# Patient Record
Sex: MALE | Race: WHITE | Employment: OTHER | URBAN - METROPOLITAN AREA
[De-identification: names, ages, dates, MRNs, and addresses within clinical notes are randomized per-mention and may not be internally consistent; named-entity substitution may affect disease eponyms.]

---

## 2017-03-05 ENCOUNTER — ED HISTORICAL/CONVERTED ENCOUNTER (OUTPATIENT)
Dept: OTHER | Age: 72
End: 2017-03-05

## 2020-12-26 ENCOUNTER — APPOINTMENT (OUTPATIENT)
Dept: GENERAL RADIOLOGY | Age: 75
End: 2020-12-26
Attending: EMERGENCY MEDICINE
Payer: MEDICARE

## 2020-12-26 ENCOUNTER — HOSPITAL ENCOUNTER (EMERGENCY)
Age: 75
Discharge: HOME | End: 2020-12-26
Attending: EMERGENCY MEDICINE
Payer: MEDICARE

## 2020-12-26 ENCOUNTER — APPOINTMENT (OUTPATIENT)
Dept: CT IMAGING | Age: 75
End: 2020-12-26
Attending: EMERGENCY MEDICINE
Payer: MEDICARE

## 2020-12-26 VITALS
TEMPERATURE: 97.7 F | RESPIRATION RATE: 17 BRPM | OXYGEN SATURATION: 98 % | HEART RATE: 81 BPM | BODY MASS INDEX: 23.7 KG/M2 | DIASTOLIC BLOOD PRESSURE: 93 MMHG | WEIGHT: 175 LBS | HEIGHT: 72 IN | SYSTOLIC BLOOD PRESSURE: 146 MMHG

## 2020-12-26 DIAGNOSIS — E86.0 DEHYDRATION: ICD-10-CM

## 2020-12-26 DIAGNOSIS — R42 DIZZINESS: Primary | ICD-10-CM

## 2020-12-26 LAB
ALBUMIN SERPL-MCNC: 3.3 G/DL (ref 3.5–5)
ALBUMIN/GLOB SERPL: 0.9 {RATIO} (ref 1.1–2.2)
ALP SERPL-CCNC: 90 U/L (ref 45–117)
ALT SERPL-CCNC: 42 U/L (ref 12–78)
ANION GAP SERPL CALC-SCNC: 9 MMOL/L (ref 5–15)
AST SERPL W P-5'-P-CCNC: 44 U/L (ref 15–37)
BASOPHILS # BLD: 0 K/UL (ref 0–0.2)
BASOPHILS NFR BLD: 1 % (ref 0–2.5)
BILIRUB SERPL-MCNC: 1.4 MG/DL (ref 0.2–1)
BUN SERPL-MCNC: 15 MG/DL (ref 6–20)
BUN/CREAT SERPL: 16 (ref 12–20)
CA-I BLD-MCNC: 8.4 MG/DL (ref 8.5–10.1)
CHLORIDE SERPL-SCNC: 95 MMOL/L (ref 97–108)
CO2 SERPL-SCNC: 30 MMOL/L (ref 21–32)
CREAT SERPL-MCNC: 0.94 MG/DL (ref 0.7–1.3)
EOSINOPHIL # BLD: 0 K/UL (ref 0–0.7)
EOSINOPHIL NFR BLD: 0 % (ref 0.9–2.9)
ERYTHROCYTE [DISTWIDTH] IN BLOOD BY AUTOMATED COUNT: 13.8 % (ref 11.5–14.5)
GLOBULIN SER CALC-MCNC: 3.8 G/DL (ref 2–4)
GLUCOSE SERPL-MCNC: 95 MG/DL (ref 65–100)
HCT VFR BLD AUTO: 46.9 % (ref 41–53)
HGB BLD-MCNC: 15.9 G/DL (ref 13.5–17.5)
LYMPHOCYTES # BLD: 1 K/UL (ref 1–4.8)
LYMPHOCYTES NFR BLD: 11 % (ref 20.5–51.1)
MCH RBC QN AUTO: 31.7 PG (ref 31–34)
MCHC RBC AUTO-ENTMCNC: 33.8 G/DL (ref 31–36)
MCV RBC AUTO: 93.9 FL (ref 80–100)
MONOCYTES # BLD: 0.9 K/UL (ref 0.2–2.4)
MONOCYTES NFR BLD: 11 % (ref 1.7–9.3)
NEUTS SEG # BLD: 6.5 K/UL (ref 1.8–7.7)
NEUTS SEG NFR BLD: 77 % (ref 42–75)
NRBC # BLD: 0 K/UL
NRBC BLD-RTO: 0 PER 100 WBC
PLATELET # BLD AUTO: 253 K/UL
PMV BLD AUTO: 7.4 FL (ref 6.5–11.5)
POTASSIUM SERPL-SCNC: 3.5 MMOL/L (ref 3.5–5.1)
PROT SERPL-MCNC: 7.1 G/DL (ref 6.4–8.2)
RBC # BLD AUTO: 5 M/UL (ref 4.5–5.9)
SODIUM SERPL-SCNC: 134 MMOL/L (ref 136–145)
TROPONIN I SERPL-MCNC: <0.05 NG/ML
WBC # BLD AUTO: 8.4 K/UL (ref 4.4–11.3)

## 2020-12-26 PROCEDURE — 71045 X-RAY EXAM CHEST 1 VIEW: CPT

## 2020-12-26 PROCEDURE — 99283 EMERGENCY DEPT VISIT LOW MDM: CPT

## 2020-12-26 PROCEDURE — 70450 CT HEAD/BRAIN W/O DYE: CPT

## 2020-12-26 PROCEDURE — 80053 COMPREHEN METABOLIC PANEL: CPT

## 2020-12-26 PROCEDURE — 74011250636 HC RX REV CODE- 250/636: Performed by: EMERGENCY MEDICINE

## 2020-12-26 PROCEDURE — 36415 COLL VENOUS BLD VENIPUNCTURE: CPT

## 2020-12-26 PROCEDURE — 93005 ELECTROCARDIOGRAM TRACING: CPT

## 2020-12-26 PROCEDURE — 85025 COMPLETE CBC W/AUTO DIFF WBC: CPT

## 2020-12-26 PROCEDURE — 84484 ASSAY OF TROPONIN QUANT: CPT

## 2020-12-26 RX ORDER — SODIUM CHLORIDE 9 MG/ML
125 INJECTION, SOLUTION INTRAVENOUS ONCE
Status: COMPLETED | OUTPATIENT
Start: 2020-12-26 | End: 2020-12-26

## 2020-12-26 RX ADMIN — SODIUM CHLORIDE 125 ML/HR: 9 INJECTION, SOLUTION INTRAVENOUS at 12:31

## 2020-12-26 NOTE — ED PROVIDER NOTES
EMERGENCY DEPARTMENT HISTORY AND PHYSICAL EXAM      Date: 12/26/2020  Patient Name: Gaye Urbina    History of Presenting Illness     Chief Complaint   Patient presents with    Fatigue     pt post covid x 1 month, pt states woke up this morning feeling weak. PT A&Ox4, fsbs 120 per EMS, VS stable, pt also states tightness in neck    Dizziness     pt states upon ambulation at home that he feels lightheaded and dizzy       History Provided By: Patient    HPI: Gaye Urbina, 77 y.o. 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 No Hx of fevers, no chest pain, no syncope, no SOB, no cough and no vomiting    There are no other complaints, changes, or physical findings at this time. PCP: None    No current facility-administered medications on file prior to encounter. No current outpatient medications on file prior to encounter. Past History     Past Medical History:  No past medical history on file. Past Surgical History:  No past surgical history on file. Family History:  No family history on file. Social History:  Social History     Tobacco Use    Smoking status: Not on file   Substance Use Topics    Alcohol use: Not on file    Drug use: Not on file       Allergies:  No Known Allergies      Review of Systems     Review of Systems   Constitutional: Negative. HENT: Negative. Eyes: Negative. Respiratory: Negative. Cardiovascular: Negative. Gastrointestinal: Negative. Endocrine: Negative. Genitourinary: Negative. Musculoskeletal: Negative. Allergic/Immunologic: Negative. Neurological: Positive for dizziness, light-headedness and numbness. Hematological: Negative. Psychiatric/Behavioral: Negative. Physical Exam     Physical Exam  Vitals signs and nursing note reviewed. Constitutional:       Appearance: Normal appearance.  He is normal weight. HENT:      Head: Normocephalic and atraumatic. Right Ear: Tympanic membrane normal.      Left Ear: Tympanic membrane normal.      Nose: Nose normal.      Mouth/Throat:      Mouth: Mucous membranes are dry. Eyes:      Extraocular Movements: Extraocular movements intact. Pupils: Pupils are equal, round, and reactive to light. Neck:      Musculoskeletal: Normal range of motion and neck supple. Cardiovascular:      Rate and Rhythm: Normal rate and regular rhythm. Pulses: Normal pulses. Heart sounds: Normal heart sounds. Pulmonary:      Effort: Pulmonary effort is normal.      Breath sounds: Normal breath sounds. Abdominal:      General: Abdomen is flat. Bowel sounds are normal.      Palpations: Abdomen is soft. Musculoskeletal: Normal range of motion. Skin:     General: Skin is warm. Neurological:      General: No focal deficit present. Mental Status: He is alert and oriented to person, place, and time. Psychiatric:         Mood and Affect: Mood normal.         Lab and Diagnostic Study Results     Labs -     Recent Results (from the past 12 hour(s))   CBC WITH AUTOMATED DIFF    Collection Time: 12/26/20 11:42 AM   Result Value Ref Range    WBC 8.4 4.4 - 11.3 K/uL    RBC 5.00 4.50 - 5.90 M/uL    HGB 15.9 13.5 - 17.5 g/dL    HCT 46.9 41 - 53 %    MCV 93.9 80 - 100 FL    MCH 31.7 31 - 34 PG    MCHC 33.8 31.0 - 36.0 g/dL    RDW 13.8 11.5 - 14.5 %    PLATELET 642 K/uL    MPV 7.4 6.5 - 11.5 FL    NRBC 0.0  WBC    ABSOLUTE NRBC 0.00 K/uL    NEUTROPHILS 77 (H) 42 - 75 %    LYMPHOCYTES 11 (L) 20.5 - 51.1 %    MONOCYTES 11 (H) 1.7 - 9.3 %    EOSINOPHILS 0 (L) 0.9 - 2.9 %    BASOPHILS 1 0.0 - 2.5 %    ABS. NEUTROPHILS 6.5 1.8 - 7.7 K/UL    ABS. LYMPHOCYTES 1.0 1.0 - 4.8 K/UL    ABS. MONOCYTES 0.9 0.2 - 2.4 K/UL    ABS. EOSINOPHILS 0.0 0.0 - 0.7 K/UL    ABS.  BASOPHILS 0.0 0.0 - 0.2 K/UL   METABOLIC PANEL, COMPREHENSIVE    Collection Time: 12/26/20 11:42 AM   Result Value Ref Range    Sodium 134 (L) 136 - 145 mmol/L    Potassium 3.5 3.5 - 5.1 mmol/L    Chloride 95 (L) 97 - 108 mmol/L    CO2 30 21 - 32 mmol/L    Anion gap 9 5 - 15 mmol/L    Glucose 95 65 - 100 mg/dL    BUN 15 6 - 20 mg/dL    Creatinine 0.94 0.70 - 1.30 mg/dL    BUN/Creatinine ratio 16 12 - 20      GFR est AA >60 >60 ml/min/1.73m2    GFR est non-AA >60 >60 ml/min/1.73m2    Calcium 8.4 (L) 8.5 - 10.1 mg/dL    Bilirubin, total 1.4 (H) 0.2 - 1.0 mg/dL    AST (SGOT) 44 (H) 15 - 37 U/L    ALT (SGPT) 42 12 - 78 U/L    Alk. phosphatase 90 45 - 117 U/L    Protein, total 7.1 6.4 - 8.2 g/dL    Albumin 3.3 (L) 3.5 - 5.0 g/dL    Globulin 3.8 2.0 - 4.0 g/dL    A-G Ratio 0.9 (L) 1.1 - 2.2     TROPONIN I    Collection Time: 12/26/20 11:42 AM   Result Value Ref Range    Troponin-I, Qt. <0.05 <0.05 ng/mL       Radiologic Studies -   @lastxrresult@  CT Results  (Last 48 hours)               12/26/20 1218  CT HEAD WO CONT Final result    Impression:  IMPRESSION: No acute finding.  shunt atypically positioned as described,   discussed with Dr Gill Randolph. Narrative: Indication: Dizziness. Dose reduction: All CT scans done at this facility are performed using dose   reduction optimization techniques as appropriate to a performed exam including   the following: Automated exposure control, adjustments of the mA and/or kV   according to patient size, or use of iterative reconstruction technique. CT head unenhanced 12/26/2020. No comparison. Left ventriculostomy, tip in the left inferior basal ganglia region in or near   the thalamus. More proximal aspect abuts the body of the left lateral ventricle   but is likely extraventricular. There is mild encephalomalacia along the course   of the shunt tube superiorly. There is generalized brain atrophy. The ventricles are not dilated. No intracranial acute hemorrhage or apparent acute infarct.    Minimal fluid in a few right mastoid air cells versus developmental. Normal left   mastoid. Normal bilateral tympanic cavity. Mucosal thickening inferior left maxillary sinus. Normal orbits. CXR Results  (Last 48 hours)               12/26/20 1205  XR CHEST PORT Final result    Impression:  IMPRESSION: No acute finding. Narrative: Indication: Dizziness. AP portable chest radiograph 26 December 2020 12:00 PM. Comparison 2015. Clear lungs. Normal heart size. Tortuous thoracic aorta. Interval left upper chest generator with wires extending to the left neck   incompletely included. No pneumothorax or pleural effusion. Unremarkable bones. Medical Decision Making   - I am the first provider for this patient. - I reviewed the vital signs, available nursing notes, past medical history, past surgical history, family history and social history. - Initial assessment performed. The patients presenting problems have been discussed, and they are in agreement with the care plan formulated and outlined with them. I have encouraged them to ask questions as they arise throughout their visit. Vital Signs-Reviewed the patient's vital signs. Patient Vitals for the past 12 hrs:   Temp Pulse Resp BP SpO2   12/26/20 1329 -- 81 17 (!) 146/93 98 %   12/26/20 1132 97.7 °F (36.5 °C) 77 20 (!) 152/90 96 %       Records Reviewed: Nursing Notes    The patient presents with altered mental status with a differential diagnosis of  cerebral hemorrhage, chronic dementia, CVA/TIA, encephalopathy, epidural hematoma, hepatic encephalopathy, hyponatremia, hypoxia, ICB, UTI and volume depletion      ED Course:  Patient symptoms have improved.  All lab results and CT Scan of Head were reported to be grossly normal         Provider Notes (Medical Decision Making):   Patient Condition is stable and much improved  MDM       Procedures   Medical Decision Makingedical Decision Making  Performed by: Mo Zurita MD  PROCEDURES   None  Procedures Disposition   Disposition: DC-The patient was given verbal chest pain warning signs and and follow-up instructions    Discharged    DISCHARGE PLAN:  1. Cannot display discharge medications since this patient is not currently admitted. 2.   Follow-up Information     Follow up With Specialties Details Why Contact Info    Stacy Tuttle MD Family Medicine Call in 3 days If symptoms worsen [address] [facility]  [phone]          3. Return to ED if worse   4. There are no discharge medications for this patient. Diagnosis     Clinical Impression:   1. Dizziness    2. Dehydration        Attestations:    Foster Rodrigez MD    Please note that this dictation was completed with [employer], the computer voice recognition software. Quite often unanticipated grammatical, syntax, homophones, and other interpretive errors are inadvertently transcribed by the computer software. Please disregard these errors. Please excuse any errors that have escaped final proofreading. Thank you.

## 2020-12-26 NOTE — ED TRIAGE NOTES
.  Chief Complaint   Patient presents with    Fatigue     pt post covid x 1 month, pt states woke up this morning feeling weak.  PT A&Ox4, fsbs 120 per EMS, VS stable, pt also states tightness in neck    Dizziness     pt states upon ambulation at home that he feels lightheaded and dizzy     Pt admits to having a couple of drinks last night

## 2020-12-26 NOTE — DISCHARGE INSTRUCTIONS
Thank you! Thank you for allowing me to care for you in the emergency department. I sincerely hope that you are satisfied with your visit today. It is my goal to provide you with excellent care. Below you will find a list of your labs and imaging from your visit today. Should you have any questions regarding these results please do not hesitate to call the emergency department. Labs -     Recent Results (from the past 12 hour(s))   CBC WITH AUTOMATED DIFF    Collection Time: 12/26/20 11:42 AM   Result Value Ref Range    WBC 8.4 4.4 - 11.3 K/uL    RBC 5.00 4.50 - 5.90 M/uL    HGB 15.9 13.5 - 17.5 g/dL    HCT 46.9 41 - 53 %    MCV 93.9 80 - 100 FL    MCH 31.7 31 - 34 PG    MCHC 33.8 31.0 - 36.0 g/dL    RDW 13.8 11.5 - 14.5 %    PLATELET 082 K/uL    MPV 7.4 6.5 - 11.5 FL    NRBC 0.0  WBC    ABSOLUTE NRBC 0.00 K/uL    NEUTROPHILS 77 (H) 42 - 75 %    LYMPHOCYTES 11 (L) 20.5 - 51.1 %    MONOCYTES 11 (H) 1.7 - 9.3 %    EOSINOPHILS 0 (L) 0.9 - 2.9 %    BASOPHILS 1 0.0 - 2.5 %    ABS. NEUTROPHILS 6.5 1.8 - 7.7 K/UL    ABS. LYMPHOCYTES 1.0 1.0 - 4.8 K/UL    ABS. MONOCYTES 0.9 0.2 - 2.4 K/UL    ABS. EOSINOPHILS 0.0 0.0 - 0.7 K/UL    ABS. BASOPHILS 0.0 0.0 - 0.2 K/UL   METABOLIC PANEL, COMPREHENSIVE    Collection Time: 12/26/20 11:42 AM   Result Value Ref Range    Sodium 134 (L) 136 - 145 mmol/L    Potassium 3.5 3.5 - 5.1 mmol/L    Chloride 95 (L) 97 - 108 mmol/L    CO2 30 21 - 32 mmol/L    Anion gap 9 5 - 15 mmol/L    Glucose 95 65 - 100 mg/dL    BUN 15 6 - 20 mg/dL    Creatinine 0.94 0.70 - 1.30 mg/dL    BUN/Creatinine ratio 16 12 - 20      GFR est AA >60 >60 ml/min/1.73m2    GFR est non-AA >60 >60 ml/min/1.73m2    Calcium 8.4 (L) 8.5 - 10.1 mg/dL    Bilirubin, total 1.4 (H) 0.2 - 1.0 mg/dL    AST (SGOT) 44 (H) 15 - 37 U/L    ALT (SGPT) 42 12 - 78 U/L    Alk.  phosphatase 90 45 - 117 U/L    Protein, total 7.1 6.4 - 8.2 g/dL    Albumin 3.3 (L) 3.5 - 5.0 g/dL    Globulin 3.8 2.0 - 4.0 g/dL    A-G Ratio 0.9 (L) 1.1 - 2.2     TROPONIN I    Collection Time: 12/26/20 11:42 AM   Result Value Ref Range    Troponin-I, Qt. <0.05 <0.05 ng/mL       Radiologic Studies -   CT HEAD WO CONT   Final Result   IMPRESSION: No acute finding.  shunt atypically positioned as described,   discussed with Dr Stephy Montiel. XR CHEST PORT   Final Result   IMPRESSION: No acute finding. CT Results  (Last 48 hours)                 12/26/20 1218  CT HEAD WO CONT Final result    Impression:  IMPRESSION: No acute finding.  shunt atypically positioned as described,   discussed with Dr Stephy Montiel. Narrative: Indication: Dizziness. Dose reduction: All CT scans done at this facility are performed using dose   reduction optimization techniques as appropriate to a performed exam including   the following: Automated exposure control, adjustments of the mA and/or kV   according to patient size, or use of iterative reconstruction technique. CT head unenhanced 12/26/2020. No comparison. Left ventriculostomy, tip in the left inferior basal ganglia region in or near   the thalamus. More proximal aspect abuts the body of the left lateral ventricle   but is likely extraventricular. There is mild encephalomalacia along the course   of the shunt tube superiorly. There is generalized brain atrophy. The ventricles are not dilated. No intracranial acute hemorrhage or apparent acute infarct. Minimal fluid in a few right mastoid air cells versus developmental. Normal left   mastoid. Normal bilateral tympanic cavity. Mucosal thickening inferior left maxillary sinus. Normal orbits. CXR Results  (Last 48 hours)                 12/26/20 1205  XR CHEST PORT Final result    Impression:  IMPRESSION: No acute finding. Narrative: Indication: Dizziness. AP portable chest radiograph 26 December 2020 12:00 PM. Comparison 2015. Clear lungs. Normal heart size. Tortuous thoracic aorta. Interval left upper chest generator with wires extending to the left neck   incompletely included. No pneumothorax or pleural effusion. Unremarkable bones. If you feel that you have not received excellent quality care or timely care, please ask to speak to the nurse manager. Please choose us in the future for your continued health care needs. ------------------------------------------------------------------------------------------------------------  The exam and treatment you received in the Emergency Department were for an urgent problem and are not intended as complete care. It is important that you follow-up with a doctor, nurse practitioner, or physician assistant to:  (1) confirm your diagnosis,  (2) re-evaluation of changes in your illness and treatment, and  (3) for ongoing care. If your symptoms become worse or you do not improve as expected and you are unable to reach your usual health care provider, you should return to the Emergency Department. We are available 24 hours a day. Please take your discharge instructions with you when you go to your follow-up appointment. If you have any problem arranging a follow-up appointment, contact the Emergency Department immediately. If a prescription has been provided, please have it filled as soon as possible to prevent a delay in treatment. Read the entire medication instruction sheet provided to you by the pharmacy. If you have any questions or reservations about taking the medication due to side effects or interactions with other medications, please call your primary care physician or contact the ER to speak with the charge nurse. Make an appointment with your family doctor or the physician you were referred to for follow-up of this visit as instructed on your discharge paperwork, as this is a mandatory follow-up.  Return to the ER if you are unable to be seen or if you are unable to be seen in a timely manner. If you have any problem arranging the follow-up visit, contact the Emergency Department immediately.

## 2020-12-28 LAB
ATRIAL RATE: 73 BPM
CALCULATED P AXIS, ECG09: 38 DEGREES
CALCULATED R AXIS, ECG10: 14 DEGREES
CALCULATED T AXIS, ECG11: 56 DEGREES
DIAGNOSIS, 93000: NORMAL
P-R INTERVAL, ECG05: 71 MS
Q-T INTERVAL, ECG07: 410 MS
QRS DURATION, ECG06: 101 MS
QTC CALCULATION (BEZET), ECG08: 458 MS
VENTRICULAR RATE, ECG03: 75 BPM

## 2021-03-03 ENCOUNTER — HOSPITAL ENCOUNTER (EMERGENCY)
Age: 76
Discharge: HOME | End: 2021-03-03
Attending: EMERGENCY MEDICINE
Payer: MEDICARE

## 2021-03-03 ENCOUNTER — APPOINTMENT (OUTPATIENT)
Dept: CT IMAGING | Age: 76
End: 2021-03-03
Attending: EMERGENCY MEDICINE
Payer: MEDICARE

## 2021-03-03 VITALS
RESPIRATION RATE: 16 BRPM | OXYGEN SATURATION: 97 % | WEIGHT: 170 LBS | SYSTOLIC BLOOD PRESSURE: 134 MMHG | HEIGHT: 72 IN | DIASTOLIC BLOOD PRESSURE: 84 MMHG | TEMPERATURE: 97.5 F | BODY MASS INDEX: 23.03 KG/M2 | HEART RATE: 79 BPM

## 2021-03-03 DIAGNOSIS — N02.9 IDIOPATHIC HEMATURIA WITH GLOMERULAR MORPHOLOGIC CHANGES: Primary | ICD-10-CM

## 2021-03-03 LAB
APPEARANCE UR: CLEAR
BACTERIA URNS QL MICRO: ABNORMAL /HPF
BILIRUB UR QL: NEGATIVE
COLOR UR: ABNORMAL
GLUCOSE UR STRIP.AUTO-MCNC: NEGATIVE MG/DL
HGB UR QL STRIP: ABNORMAL
KETONES UR QL STRIP.AUTO: ABNORMAL MG/DL
LEUKOCYTE ESTERASE UR QL STRIP.AUTO: NEGATIVE
NITRITE UR QL STRIP.AUTO: NEGATIVE
PH UR STRIP: 7 [PH] (ref 5–8)
PROT UR STRIP-MCNC: NEGATIVE MG/DL
RBC #/AREA URNS HPF: >100 /HPF (ref 0–3)
SP GR UR REFRACTOMETRY: 1.02 (ref 1–1.03)
UROBILINOGEN UR QL STRIP.AUTO: 1 EU/DL (ref 0.2–1)
WBC URNS QL MICRO: ABNORMAL /HPF (ref 0–5)

## 2021-03-03 PROCEDURE — 81001 URINALYSIS AUTO W/SCOPE: CPT

## 2021-03-03 PROCEDURE — 74176 CT ABD & PELVIS W/O CONTRAST: CPT

## 2021-03-03 PROCEDURE — 99283 EMERGENCY DEPT VISIT LOW MDM: CPT

## 2021-03-03 RX ORDER — HYDROCHLOROTHIAZIDE 25 MG/1
25 TABLET ORAL DAILY
COMMUNITY

## 2021-03-03 RX ORDER — LISINOPRIL 2.5 MG/1
2.5 TABLET ORAL DAILY
COMMUNITY

## 2021-03-03 RX ORDER — PRAVASTATIN SODIUM 10 MG/1
TABLET ORAL
COMMUNITY

## 2021-03-03 NOTE — DISCHARGE INSTRUCTIONS
Thank you! Thank you for allowing me to care for you in the emergency department. I sincerely hope that you are satisfied with your visit today. It is my goal to provide you with excellent care. Below you will find a list of your labs and imaging from your visit today. Should you have any questions regarding these results please do not hesitate to call the emergency department. Labs -     Recent Results (from the past 12 hour(s))   URINALYSIS W/ RFLX MICROSCOPIC    Collection Time: 03/03/21  1:49 PM   Result Value Ref Range    Color Yellow/Straw      Appearance Clear Clear      Specific gravity 1.020 1.003 - 1.030      pH (UA) 7.0 5.0 - 8.0      Protein Negative Negative mg/dL    Glucose Negative Negative mg/dL    Ketone Trace (A) Negative mg/dL    Bilirubin Negative Negative      Blood Large (A) Negative      Urobilinogen 1.0 0.2 - 1.0 EU/dL    Nitrites Negative Negative      Leukocyte Esterase Negative Negative     URINE MICROSCOPIC    Collection Time: 03/03/21  1:49 PM   Result Value Ref Range    WBC 0-4 0 - 5 /hpf    RBC >100 (H) 0 - 3 /hpf    Bacteria 1+ (A) Negative /hpf       Radiologic Studies -   CT ABD PELV WO CONT   Final Result   No renal calculi or hydronephrosis. Bladder wall prominence. Other   findings as above. CT Results  (Last 48 hours)                 03/03/21 1424  CT ABD PELV WO CONT Final result    Impression:  No renal calculi or hydronephrosis. Bladder wall prominence. Other   findings as above. Narrative:  CT abdomen and pelvis, 3/3/2021       History: Blood in urine. Stones. Technique: No oral or intravenous contrast was administered. Multiple   contiguous axial images were obtained from the diaphragm to the inferior pubic   rami. Coronal and sagittal reconstruction of images was made.        All CT scans at this facility are performed using dose reduction optimization   techniques as appropriate to perform the exam including the following: Automated exposure control, adjustments of the mA and/or kV according to patient size, or   use iterative reconstruction technique. Comparison:  None. Findings: The included lung bases show minimal atelectasis. Small   fat-containing Bochdalek hernias are seen. The liver, gallbladder, spleen, adrenal glands, pancreas demonstrate no focal   abnormality on this noncontrast enhanced study. The kidneys show no calculi, perinephric inflammatory changes or hydronephrosis. The ureters are normal in caliber. Trace fat herniation to the umbilicus is noted. The stomach has normal contours. There is no evidence of mechanical bowel   obstruction. The terminal ileum is within normal limits. The appendix images   normally. There is no specific CT evidence of diverticulitis or colitis. The bladder is partially distended. Generalized bladder wall prominence may be   due to under distention, cystitis or sequela of chronic outlet obstruction. Clinical correlation is recommended. No additional calculus is seen. The prostate gland measures 4.4 cm x 3.6 cm and has central calcification. The   seminal vesicles images normally. Small amount of fat is seen within the   inguinal canals. The abdominal aorta is normal in caliber and has atherosclerotic plaque. No free air or free fluid is noted. Degenerative changes are seen in the spine. Chronic appearing minimal   depression of the superior endplates of L2 and L3 are noted. A few sclerotic   foci in the bony pelvis are probably bone islands. CXR Results  (Last 48 hours)      None               If you feel that you have not received excellent quality care or timely care, please ask to speak to the nurse manager. Please choose us in the future for your continued health care needs.    ------------------------------------------------------------------------------------------------------------  The exam and treatment you received in the Emergency Department were for an urgent problem and are not intended as complete care. It is important that you follow-up with a doctor, nurse practitioner, or physician assistant to:  (1) confirm your diagnosis,  (2) re-evaluation of changes in your illness and treatment, and  (3) for ongoing care. If your symptoms become worse or you do not improve as expected and you are unable to reach your usual health care provider, you should return to the Emergency Department. We are available 24 hours a day. Please take your discharge instructions with you when you go to your follow-up appointment. If you have any problem arranging a follow-up appointment, contact the Emergency Department immediately. If a prescription has been provided, please have it filled as soon as possible to prevent a delay in treatment. Read the entire medication instruction sheet provided to you by the pharmacy. If you have any questions or reservations about taking the medication due to side effects or interactions with other medications, please call your primary care physician or contact the ER to speak with the charge nurse. Make an appointment with your family doctor or the physician you were referred to for follow-up of this visit as instructed on your discharge paperwork, as this is a mandatory follow-up. Return to the ER if you are unable to be seen or if you are unable to be seen in a timely manner. If you have any problem arranging the follow-up visit, contact the Emergency Department immediately.

## 2021-03-04 NOTE — ED PROVIDER NOTES
EMERGENCY DEPARTMENT HISTORY AND PHYSICAL EXAM      Date: 3/3/2021  Patient Name: Jorge Luis Block    History of Presenting Illness     Chief Complaint   Patient presents with    Blood in Urine       History Provided By: Patient    HPI: Jorge Luis Block, 77 y.o. male with a past medical history significant hypertension, hyperlipidemia and DVTs Santos presents to the ED with cc of gross hematuria x 24 hours. Denies any trauma, no dysuria, no fevers, no vomiting, no melena, no headaches. Has mild urinary hesitancy x 24  hours     There are no other complaints, changes, or physical findings at this time. PCP: None    No current facility-administered medications on file prior to encounter. Current Outpatient Medications on File Prior to Encounter   Medication Sig Dispense Refill    lisinopriL (PRINIVIL, ZESTRIL) 2.5 mg tablet Take 2.5 mg by mouth daily.  pravastatin (PRAVACHOL) 10 mg tablet Take  by mouth nightly.  apixaban (Eliquis) 5 mg tablet Take 5 mg by mouth two (2) times a day.  hydroCHLOROthiazide (HYDRODIURIL) 25 mg tablet Take 25 mg by mouth daily. 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 No pertinent family history. Social History:  Social History     Tobacco Use    Smoking status: Former Smoker    Smokeless tobacco: Never Used   Substance Use Topics    Alcohol use: Yes    Drug use: Never       Allergies:  No Known Allergies      Review of Systems     Review of Systems   Constitutional: Negative. HENT: Negative. Eyes: Negative. Respiratory: Negative. Cardiovascular: Negative. Gastrointestinal: Negative. Endocrine: Negative. Genitourinary: Negative. Musculoskeletal: Negative. Allergic/Immunologic: Negative. Neurological: Negative. Hematological: Negative. Psychiatric/Behavioral: Negative. Physical Exam     Physical Exam  Vitals signs and nursing note reviewed. Constitutional:       Appearance: Normal appearance. HENT:      Head: Normocephalic and atraumatic. Nose: Nose normal.   Eyes:      Extraocular Movements: Extraocular movements intact. Pupils: Pupils are equal, round, and reactive to light. Neck:      Musculoskeletal: Normal range of motion and neck supple. Cardiovascular:      Rate and Rhythm: Normal rate and regular rhythm. Pulses: Normal pulses. Heart sounds: Normal heart sounds. Pulmonary:      Effort: Pulmonary effort is normal.      Breath sounds: Normal breath sounds. Abdominal:      General: Abdomen is flat. Bowel sounds are normal.      Palpations: Abdomen is soft. Genitourinary:     Comments: Deferred   Musculoskeletal: Normal range of motion. Skin:     General: Skin is warm and dry. Neurological:      General: No focal deficit present. Mental Status: He is alert and oriented to person, place, and time. Mental status is at baseline. Psychiatric:         Mood and Affect: Mood normal.         Behavior: Behavior normal.         Lab and Diagnostic Study Results     Labs -   No results found for this or any previous visit (from the past 12 hour(s)). Radiologic Studies -   @lastxrresult@  CT Results  (Last 48 hours)               03/03/21 1424  CT ABD PELV WO CONT Final result    Impression:  No renal calculi or hydronephrosis. Bladder wall prominence. Other   findings as above. Narrative:  CT abdomen and pelvis, 3/3/2021       History: Blood in urine. Stones. Technique: No oral or intravenous contrast was administered. Multiple   contiguous axial images were obtained from the diaphragm to the inferior pubic   rami. Coronal and sagittal reconstruction of images was made.        All CT scans at this facility are performed using dose reduction optimization   techniques as appropriate to perform the exam including the following: Automated   exposure control, adjustments of the mA and/or kV according to patient size, or   use iterative reconstruction technique. Comparison:  None. Findings: The included lung bases show minimal atelectasis. Small   fat-containing Bochdalek hernias are seen. The liver, gallbladder, spleen, adrenal glands, pancreas demonstrate no focal   abnormality on this noncontrast enhanced study. The kidneys show no calculi, perinephric inflammatory changes or hydronephrosis. The ureters are normal in caliber. Trace fat herniation to the umbilicus is noted. The stomach has normal contours. There is no evidence of mechanical bowel   obstruction. The terminal ileum is within normal limits. The appendix images   normally. There is no specific CT evidence of diverticulitis or colitis. The bladder is partially distended. Generalized bladder wall prominence may be   due to under distention, cystitis or sequela of chronic outlet obstruction. Clinical correlation is recommended. No additional calculus is seen. The prostate gland measures 4.4 cm x 3.6 cm and has central calcification. The   seminal vesicles images normally. Small amount of fat is seen within the   inguinal canals. The abdominal aorta is normal in caliber and has atherosclerotic plaque. No free air or free fluid is noted. Degenerative changes are seen in the spine. Chronic appearing minimal   depression of the superior endplates of L2 and L3 are noted. A few sclerotic   foci in the bony pelvis are probably bone islands. CXR Results  (Last 48 hours)    None            Medical Decision Making   - I am the first provider for this patient.     - I reviewed the vital signs, available nursing notes, past medical history, past surgical history, family history and social history. - Initial assessment performed. The patients presenting problems have been discussed, and they are in agreement with the care plan formulated and outlined with them. I have encouraged them to ask questions as they arise throughout their visit. Vital Signs-Reviewed the patient's vital signs. No data found. Records Reviewed: Nursing Notes    The patient presents with gross hematuria with a differential diagnosis of renal cancer, UTI, Prostatitis, bladder cancer, coumadin toxicity    ED Course:   Patient's symptoms, examination, lab test results and CT Scan findings were noted and reviewed. Patient's symptoms of gross hematuria is due to Elquis and instructed patient to hold medicines for 3 days and to follow-up with hs PCP       Provider Notes (Medical Decision Making):   Patient's symptoms, examination, lab test results and CT Scan findings were noted and reviewed. Patient's symptoms of gross hematuria is due to Elquis and instructed patient to hold medicines for 3 days and to follow-up with hs PCP       MDM       Procedures   Medical Decision Makingedical Decision Making  Performed by: Effie Diaz MD  PROCEDURES:  None  Procedures       Disposition   Disposition: DC- Adult Discharges: All of the diagnostic tests were reviewed and questions answered. Diagnosis, care plan and treatment options were discussed. The patient understands the instructions and will follow up as directed. The patients results have been reviewed with them. They have been counseled regarding their diagnosis. The patient verbally convey understanding and agreement of the signs, symptoms, diagnosis, treatment and prognosis and additionally agrees to follow up as recommended with their PCP in 24 - 48 hours. They also agree with the care-plan and convey that all of their questions have been answered.   I have also put together some discharge instructions for them that include: 1) educational information regarding their diagnosis, 2) how to care for their diagnosis at home, as well a 3) list of reasons why they would want to return to the ED prior to their follow-up appointment, should their condition change. Discharged    DISCHARGE PLAN:  1. Cannot display discharge medications since this patient is not currently admitted. 2.   Follow-up Information     Follow up With Specialties Details Why Molina Rossi MD Internal Medicine Call in 3 days If symptoms worsen [id number] [facility] [phone]          3. Return to ED if worse   4. Discharge Medication List as of 3/3/2021  3:47 PM            Diagnosis     Clinical Impression:   1. Idiopathic hematuria with glomerular morphologic changes        Attestations:    Cori Babinski, MD    Please note that this dictation was completed with [employer], the computer voice recognition software. Quite often unanticipated grammatical, syntax, homophones, and other interpretive errors are inadvertently transcribed by the computer software. Please disregard these errors. Please excuse any errors that have escaped final proofreading. Thank you.

## 2023-06-05 ENCOUNTER — HOSPITAL ENCOUNTER (EMERGENCY)
Facility: HOSPITAL | Age: 78
Discharge: HOME | End: 2023-06-05
Attending: EMERGENCY MEDICINE
Payer: MEDICARE

## 2023-06-05 VITALS
HEIGHT: 71 IN | WEIGHT: 178 LBS | HEART RATE: 84 BPM | RESPIRATION RATE: 18 BRPM | SYSTOLIC BLOOD PRESSURE: 126 MMHG | BODY MASS INDEX: 24.92 KG/M2 | TEMPERATURE: 97.9 F | DIASTOLIC BLOOD PRESSURE: 76 MMHG | OXYGEN SATURATION: 98 %

## 2023-06-05 DIAGNOSIS — R31.9 URINARY TRACT INFECTION WITH HEMATURIA, SITE UNSPECIFIED: Primary | ICD-10-CM

## 2023-06-05 DIAGNOSIS — N39.0 URINARY TRACT INFECTION WITH HEMATURIA, SITE UNSPECIFIED: Primary | ICD-10-CM

## 2023-06-05 LAB
APPEARANCE UR: CLEAR
BACTERIA URNS QL MICRO: NEGATIVE /HPF
BILIRUB UR QL CFM: POSITIVE
COLOR UR: ABNORMAL
GLUCOSE UR STRIP.AUTO-MCNC: NEGATIVE MG/DL
HGB UR QL STRIP: ABNORMAL
KETONES UR QL STRIP.AUTO: NEGATIVE MG/DL
LEUKOCYTE ESTERASE UR QL STRIP.AUTO: ABNORMAL
NITRITE UR QL STRIP.AUTO: POSITIVE
PH UR STRIP: 5 (ref 5–8)
PROT UR STRIP-MCNC: 100 MG/DL
RBC #/AREA URNS HPF: ABNORMAL /HPF (ref 0–3)
SP GR UR REFRACTOMETRY: 1.01 (ref 1–1.03)
UROBILINOGEN UR QL STRIP.AUTO: 1 EU/DL (ref 0.2–1)
WBC URNS QL MICRO: ABNORMAL /HPF (ref 0–5)

## 2023-06-05 PROCEDURE — 96372 THER/PROPH/DIAG INJ SC/IM: CPT

## 2023-06-05 PROCEDURE — 99284 EMERGENCY DEPT VISIT MOD MDM: CPT

## 2023-06-05 PROCEDURE — 2500000003 HC RX 250 WO HCPCS: Performed by: EMERGENCY MEDICINE

## 2023-06-05 PROCEDURE — 6360000002 HC RX W HCPCS: Performed by: EMERGENCY MEDICINE

## 2023-06-05 PROCEDURE — 81001 URINALYSIS AUTO W/SCOPE: CPT

## 2023-06-05 RX ORDER — CEPHALEXIN 500 MG/1
500 CAPSULE ORAL 4 TIMES DAILY
Qty: 28 CAPSULE | Refills: 0 | Status: SHIPPED | OUTPATIENT
Start: 2023-06-05 | End: 2023-06-12

## 2023-06-05 RX ADMIN — LIDOCAINE HYDROCHLORIDE 1000 MG: 10 INJECTION, SOLUTION EPIDURAL; INFILTRATION; INTRACAUDAL; PERINEURAL at 10:46

## 2023-06-05 ASSESSMENT — LIFESTYLE VARIABLES
HOW MANY STANDARD DRINKS CONTAINING ALCOHOL DO YOU HAVE ON A TYPICAL DAY: PATIENT DOES NOT DRINK
HOW OFTEN DO YOU HAVE A DRINK CONTAINING ALCOHOL: NEVER

## 2023-06-05 NOTE — ED TRIAGE NOTES
Patient reports he was out in the sun on Saturday and when he got home was extremely fatigues reports that last night he had to urinate every 30 minutes and now he has blood tinged urine

## 2023-06-05 NOTE — ED PROVIDER NOTES
disregards these errors.  Please excuse any errors that have escaped final proofreading.)       Jesús PresMD stormy  06/05/23 1144

## 2023-06-21 ENCOUNTER — HOSPITAL ENCOUNTER (EMERGENCY)
Facility: HOSPITAL | Age: 78
Discharge: HOME | End: 2023-06-21
Attending: EMERGENCY MEDICINE
Payer: MEDICARE

## 2023-06-21 ENCOUNTER — APPOINTMENT (OUTPATIENT)
Facility: HOSPITAL | Age: 78
End: 2023-06-21
Payer: MEDICARE

## 2023-06-21 VITALS
SYSTOLIC BLOOD PRESSURE: 150 MMHG | DIASTOLIC BLOOD PRESSURE: 75 MMHG | WEIGHT: 178 LBS | HEIGHT: 71 IN | RESPIRATION RATE: 18 BRPM | OXYGEN SATURATION: 98 % | TEMPERATURE: 97.8 F | HEART RATE: 70 BPM | BODY MASS INDEX: 24.92 KG/M2

## 2023-06-21 DIAGNOSIS — N30.01 ACUTE CYSTITIS WITH HEMATURIA: Primary | ICD-10-CM

## 2023-06-21 LAB
APPEARANCE UR: CLEAR
BACTERIA URNS QL MICRO: ABNORMAL /HPF
BILIRUB UR QL: NEGATIVE
COLOR UR: ABNORMAL
GLUCOSE UR STRIP.AUTO-MCNC: NEGATIVE MG/DL
HGB UR QL STRIP: ABNORMAL
KETONES UR QL STRIP.AUTO: NEGATIVE MG/DL
LEUKOCYTE ESTERASE UR QL STRIP.AUTO: ABNORMAL
NITRITE UR QL STRIP.AUTO: NEGATIVE
PH UR STRIP: 7 (ref 5–8)
PROT UR STRIP-MCNC: 30 MG/DL
RBC #/AREA URNS HPF: ABNORMAL /HPF (ref 0–3)
SP GR UR REFRACTOMETRY: 1.01 (ref 1–1.03)
URINE CULTURE IF INDICATED: ABNORMAL
UROBILINOGEN UR QL STRIP.AUTO: 0.2 EU/DL (ref 0.2–1)
WBC URNS QL MICRO: ABNORMAL /HPF (ref 0–5)

## 2023-06-21 PROCEDURE — 6370000000 HC RX 637 (ALT 250 FOR IP): Performed by: EMERGENCY MEDICINE

## 2023-06-21 PROCEDURE — 81001 URINALYSIS AUTO W/SCOPE: CPT

## 2023-06-21 PROCEDURE — 99284 EMERGENCY DEPT VISIT MOD MDM: CPT

## 2023-06-21 PROCEDURE — 96372 THER/PROPH/DIAG INJ SC/IM: CPT

## 2023-06-21 PROCEDURE — 74176 CT ABD & PELVIS W/O CONTRAST: CPT

## 2023-06-21 PROCEDURE — 6360000002 HC RX W HCPCS: Performed by: EMERGENCY MEDICINE

## 2023-06-21 PROCEDURE — 87186 SC STD MICRODIL/AGAR DIL: CPT

## 2023-06-21 PROCEDURE — 87077 CULTURE AEROBIC IDENTIFY: CPT

## 2023-06-21 PROCEDURE — 87086 URINE CULTURE/COLONY COUNT: CPT

## 2023-06-21 RX ORDER — TAMSULOSIN HYDROCHLORIDE 0.4 MG/1
0.4 CAPSULE ORAL DAILY
Status: DISCONTINUED | OUTPATIENT
Start: 2023-06-22 | End: 2023-06-21

## 2023-06-21 RX ORDER — LEVOFLOXACIN 750 MG/1
750 TABLET ORAL
Status: COMPLETED | OUTPATIENT
Start: 2023-06-21 | End: 2023-06-21

## 2023-06-21 RX ORDER — TAMSULOSIN HYDROCHLORIDE 0.4 MG/1
0.8 CAPSULE ORAL
Status: COMPLETED | OUTPATIENT
Start: 2023-06-21 | End: 2023-06-21

## 2023-06-21 RX ORDER — KETOROLAC TROMETHAMINE 30 MG/ML
30 INJECTION, SOLUTION INTRAMUSCULAR; INTRAVENOUS ONCE
Status: COMPLETED | OUTPATIENT
Start: 2023-06-21 | End: 2023-06-21

## 2023-06-21 RX ORDER — LEVOFLOXACIN 500 MG/1
750 TABLET, FILM COATED ORAL DAILY
Qty: 8 TABLET | Refills: 0 | Status: SHIPPED | OUTPATIENT
Start: 2023-06-22 | End: 2023-06-27

## 2023-06-21 RX ORDER — TAMSULOSIN HYDROCHLORIDE 0.4 MG/1
0.4 CAPSULE ORAL DAILY
Qty: 10 CAPSULE | Refills: 0 | Status: SHIPPED | OUTPATIENT
Start: 2023-06-21 | End: 2023-07-01

## 2023-06-21 RX ADMIN — LEVOFLOXACIN 750 MG: 750 TABLET, FILM COATED ORAL at 11:32

## 2023-06-21 RX ADMIN — TAMSULOSIN HYDROCHLORIDE 0.8 MG: 0.4 CAPSULE ORAL at 11:32

## 2023-06-21 RX ADMIN — KETOROLAC TROMETHAMINE 30 MG: 30 INJECTION, SOLUTION INTRAMUSCULAR; INTRAVENOUS at 11:03

## 2023-06-21 NOTE — ED PROVIDER NOTES
[address] [facility]  EMERGENCY DEPARTMENT HISTORY AND PHYSICAL EXAM      Date: 6/21/2023  Patient Name: Gal Henry  MRN: [id number]  [place]: 1945  Date of evaluation: 6/21/2023  Provider: Scottie Garcia MD   Note Started: 10:23 AM EDT 6/21/23    HISTORY OF PRESENT ILLNESS     Chief Complaint   Patient presents with    Back Pain    Urinary Frequency       History Provided By: Patient    HPI: Gal Henry is a 69 y.o. 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 No pertinent family history. Social History:  Social History     Tobacco Use    Smoking status: Former    Smokeless tobacco: Never   Substance Use Topics    Alcohol use: Yes    Drug use: Never       Allergies:  No Known Allergies    PCP: Nader Jiang MD    Current Meds:   Current Facility-Administered Medications   Medication Dose Route Frequency Provider Last Rate Last Admin    ketorolac (TORADOL) injection 30 mg  30 mg IntraMUSCular Once Scottie Garcia MD         No current outpatient medications on file.        Social Determinants of Health:   Social Determinants of Health     Tobacco Use: Medium Risk    Smoking Tobacco Use: Former    Smokeless Tobacco Use: Never    Passive Exposure: Not on file   Alcohol Use: Not At Risk    Frequency of Alcohol Consumption: Never    Average Number of Drinks: Patient does not drink    Frequency of Binge Drinking: Never   Financial Resource Strain: Not on file   Food Insecurity: Not on file   Transportation Needs: Not on file   Physical Activity: Not on file   Stress: Not on file   Social Connections: Not on file   Intimate Partner Violence: Not on file   Depression: Not on file

## 2023-06-21 NOTE — ED TRIAGE NOTES
Patient reports right lower back pain that has progressed to bilateral back pain with urinary burning and frequency x2 days patient recently treated for UTI x2 weeks ago with keflex

## 2023-06-23 LAB
BACTERIA SPEC CULT: ABNORMAL
COLONY COUNT, CNT: ABNORMAL
Lab: ABNORMAL